# Patient Record
Sex: FEMALE | Race: WHITE | Employment: OTHER | URBAN - METROPOLITAN AREA
[De-identification: names, ages, dates, MRNs, and addresses within clinical notes are randomized per-mention and may not be internally consistent; named-entity substitution may affect disease eponyms.]

---

## 2020-03-10 ENCOUNTER — HOSPITAL ENCOUNTER (OUTPATIENT)
Dept: PHYSICAL THERAPY | Age: 70
Discharge: HOME | End: 2020-03-10
Payer: MEDICARE

## 2020-03-10 PROCEDURE — 97161 PT EVAL LOW COMPLEX 20 MIN: CPT

## 2020-03-10 PROCEDURE — 97110 THERAPEUTIC EXERCISES: CPT

## 2020-03-10 NOTE — PROGRESS NOTES
PT DAILY TREATMENT NOTE/LUMBAR EVAL 10-18    Patient Name: Zain Laguna  Date:3/10/2020  : 1950  [x]  Patient  Verified  Payor: [place] MEDICARE / Plan: [place] MEDICARE PART A & B / Product Type: Medicare /    In time:1100  Out time:1200  Total Treatment Time (min): 60  Visit #: 1 of 24    Medicare/BCBS Only   Total Timed Codes (min):  25 1:1 Treatment Time:  60     Treatment Area: Left hip pain [M25.552]  SUBJECTIVE  Pain Level (0-10 scale): 0  []constant [x]intermittent []improving []worsening []no change since onset    Any medication changes, allergies to medications, adverse drug reactions, diagnosis change, or new procedure performed?: [x] No    [] Yes (see summary sheet for update)  Subjective functional status/changes:       Patient presents with c/o left hip and groin pain since 2019 and has become progressively worse in the last two months. Patient describes pain as dull, grabbing. Denies numbness/tingling. Denies popping/clicking. Aggravating factors:bending for extended periods and reaching overhead. Alleviating factors: stretching and sometimes walking. Denies red flags: SOB, chest pain, dizziness/lightheadedness, blurred/double vision, HA, chills/fevers, night sweats, change in bowel/bladder control, abdominal pain, difficulty swallowing, slurred speech, unexplained weight gain/loss, nausea, vomiting. PMHx: OA, Hx of PE. Surgical Hx: Left and right TKA, left foot surgery. Social Hx: Lives with friend in a single story home, work status: retired teacher. PLOF: Volunteers at [employer] and hospital. CLOF: Reduced activity tolerance.   Diagnostic Imaging: Xray: Mild OA of the left hip    OBJECTIVE/EXAMINATION    35 min [x]Eval                  []Re-Eval       25 min Therapeutic Exercise:  [x] See flow sheet :   Rationale: increase ROM, increase strength and decrease pain to improve the patients ability to complete ADLs          With   [x] TE   [] TA   [] neuro   [] other: Patient Education: [x] Review HEP    [] Progressed/Changed HEP based on:   [] positioning   [] body mechanics   [] transfers   [] heat/ice application    [] other:      Physical Therapy Evaluation - Lumbar Spine    OBJECTIVE  Posture: Forward head and rounded shoulders    Gait:  [] Normal     [x] Abnormal: Patient ambulated with an antalgic gait pattern placing more weight on the right while attempting to unload left.      Active Movements: [] N/A   [] Too acute   [] Other:  ROM % AROM Comments:pain, area   Forward flexion 40-60 75    Extension 20-30 25    SB right 20-30 75    SB left 20-30 100    Rotation right 5-10 100    Rotation left 5-10 100      Repeated Movements   Effects on present pain: produces (MO), abolishes (A), increases (incr), decreases (decr), centralizes (C), peripheral (PH), no effect (NE)   Pre-Test Sx Flexion Repeated Flexion Extension Repeated Extension Repeated SBL Repeated SBR   Sitting  NE        Standing NE NE NE NE Incr Incr NE   Lying      N/A N/A   Comments: Increase in left lateral shift  Side Glide:Right Lateral shift reduced symptoms     Neuro Screen [x] WNL    Dural Mobility:  SLR Supine: [x] R    [x] L    [] +    [x] -    Slump Test: [x] R    [x] L    [] +    [x] -    Prone Knee Bend: [] R    [] L    [] +    [] -     Palpation  [] Min  [] Mod  [] Severe    Location:  [] Min  [] Mod  [] Severe    Location:    Strength   L(0-5) R (0-5) N/T   Hip Flexion (L1,2) 4- 4 []   Knee Extension (L3,4) 4 4+ []   Ankle Dorsiflexion (L4) 4 4 []   Great Toe Extension (L5) 4 4 []   Ankle Plantarflexion (S1) 4- 4 []   Knee Flexion (S1,2) 4- 4 []   Abdominals 4- 4- []   Paraspinals 4- 4- []   Back Rotators 4 4 []   Gluteus Roberto 4- 4- []   Hip Abduction 4- 4 []         Special Tests    Sacroilliac:  Gaenslen's: [] R    [] L    [] +    [x] -     Compression: [] +    [x] -     Gapping:  [] +    [x] -     Thigh Thrust: [] R    [] L    [] +    [x] -            Hip: Maryjane:  [] R    [] L    [] +    [x] -     Yasmine:  [] R    [] L    [] +    [x] -     Piriformis: [] R    [] L    [] +    [x] -     Other tests/comments:  ROM/Strength        AROM                      Hip Left Right   Flexion 120 120   Extension 35 30   Abduction 40 40   ER 45 45   IR 40 25     30 second sit to stand: 13 repetitions    Pain Level (0-10 scale) post treatment: 0    ASSESSMENT/Changes in Function:     Patient presents with c/o left hip and groin pain since October 2019 with no mechanism of injury that has become progressively worse in the last two months. Patient demonstrated reduced lumbar ROM. She has decreased strength in bilateral lower extremities left more than right. She has reduced global hip strength on the left. Patient ambulated with an antalgic gait pattern placing more weight on the right while attempting to unload left. She had an increase in symptoms with lumbar extension and left side bending. She had a reduction in symptoms with lateral glide and forward flexion activities. Patient presents with signs/symptoms consistent with lumbar radiculopathy. Patient will benefit from skilled PT services to modify and progress therapeutic interventions, address functional mobility deficits, address ROM deficits, address strength deficits, analyze and address soft tissue restrictions, analyze and cue movement patterns, analyze and modify body mechanics/ergonomics and assess and modify postural abnormalities to attain her goals.      [x]  See Plan of Care  []  See progress note/recertification  []  See Discharge Summary         Progress towards goals / Updated goals:  See POC    PLAN  []  Upgrade activities as tolerated     [x]  Continue plan of care  []  Update interventions per flow sheet       []  Discharge due to:_  []  Other:_      Nile Forman PT, DPT 3/10/2020  10:52 AM

## 2020-03-10 NOTE — PROGRESS NOTES
[facility] at [address] [place] [address] Drive: [phone]   Fax: [phone]  PLAN OF CARE / [address] [place] [address] PHYSICAL THERAPY SERVICES  Patient Name: Magy Velazquez : 1950   Medical   Diagnosis: Left hip pain [M25.552]  Low back pain [M54.5] Treatment Diagnosis: Left Hip Pain  Low Back Pain   Onset Date: Chronic     Referral Source: FELIX Berman Start of Care [facility]): 3/10/2020   Prior Hospitalization: See medical history Provider #: [id number]   Prior Level of Function: Volunteer at [employer] and [facility]   Comorbidities: OA, Hx of PE   Medications: Verified on Patient Summary List   The Plan of Care and following information is based on the information from the initial evaluation.   ===========================================================================================  Assessment / key information:  Magy Velazquez is a 78 y.o.  female presents with  c/o left hip and groin pain since 2019 with no mechanism of injury that has become progressively worse in the last two months. Patient demonstrated reduced lumbar ROM. She has decreased strength in bilateral lower extremities left more than right. She has reduced global hip strength on the left. Patient ambulated with an antalgic gait pattern placing more weight on the right while attempting to unload left. She had an increase in symptoms with lumbar extension and left side bending. She had a reduction in symptoms with lateral glide and forward flexion activities. Patient presents with signs/symptoms consistent with lumbar radiculopathy.  Patient will benefit from skilled PT services to modify and progress therapeutic interventions, address functional mobility deficits, address ROM deficits, address strength deficits, analyze and address soft tissue restrictions, analyze and cue movement patterns, analyze and modify body mechanics/ergonomics and assess and modify postural abnormalities to attain her goals. .    ===========================================================================================  Eval Complexity: History MEDIUM  Complexity : 1-2 comorbidities / personal factors will impact the outcome/ POC ;  Examination  LOW Complexity : 1-2 Standardized tests and measures addressing body structure, function, activity limitation and / or participation in recreation ; Presentation LOW Complexity : Stable, uncomplicated ;  Decision Making MEDIUM Complexity : FOTO score of 26-74; Overall Complexity LOW   Problem List: pain affecting function, decrease ROM, decrease strength, impaired gait/ balance, decrease ADL/ functional abilitiies, decrease activity tolerance, decrease flexibility/ joint mobility and decrease transfer abilities   Treatment Plan may include any combination of the following: Therapeutic exercise, Therapeutic activities, Neuromuscular re-education, Physical agent/modality, Gait/balance training, Manual therapy, Patient education, Self Care training, Functional mobility training, Home safety training and Stair training  Patient / Family readiness to learn indicated by: asking questions, trying to perform skills and interest  Persons(s) to be included in education: patient (P)  Barriers to Learning/Limitations: no  Measures taken if barriers to learning:   Patient Goal (s): \"reduction of pain, pain free would be even better\"   Patient self reported health status: excellent  Rehabilitation Potential: good  Goals:  Short Term Goals: To be accomplished in 4 weeks:   Patient will report compliance with HEP 1x/day to aid in rehabilitation program.   Status at IE: Provided initial HEP   Current: Same as IE    Long Term Goals: To be accomplished in 8 weeks:   Patient will increase bilateral LE strength to 5/5 MMT throughout to aid in completion of ADLs.    Status at IE:4-/5   Current:Same as IE     Patient will report pain no greater than 1-2/10 throughout entire day to aid in completion of ADLs.   Status at IE:0-5/10   Current:Same as IE     Patent will be able to sit for 30 mins to be able to drive to appointments and complete ADLs. Status at IE:Limited sitting tolerance   Current: Same as IE     Patent will be able to perform 16 repetitions during 30 second sit to stand test to improve activity tolerance with recreational activities and ADLs. Status at IE:13   Current: Same as IE     Patient will improve FOTO score to 66 points to demonstrate improvement in functional status. Status at IE:FOTO score = 55 (an established functional score where 100 = no disability)   Current: Same as IE      Frequency / Duration:   Patient to be seen 2  times per week for 12  weeks:  Patient / Caregiver education and instruction: self care and exercises      Therapist Signature: Mar Reynoso PT, DPT Date: 7/24/8102   Certification Period: 3/10/2020 - 6/8/2020 Time: 12:18 PM   ===========================================================================================  I certify that the above Physical Therapy Services are being furnished while the patient is under my care. I agree with the treatment plan and certify that this therapy is necessary. Physician Signature:        Date:       Time:     Please sign and return to [facility] or you may fax the signed copy to [phone]. Thank you.

## 2020-03-11 ENCOUNTER — HOSPITAL ENCOUNTER (OUTPATIENT)
Dept: PHYSICAL THERAPY | Age: 70
Discharge: HOME | End: 2020-03-11
Payer: MEDICARE

## 2020-03-11 PROCEDURE — 97110 THERAPEUTIC EXERCISES: CPT | Performed by: PHYSICAL THERAPIST

## 2020-03-11 NOTE — PROGRESS NOTES
PT DAILY TREATMENT NOTE    Patient Name: Laila Hernadez  Date:3/11/2020  : 1950  [x]  Patient  Verified  Payor: Myranda Muro / Plan: VA MEDICARE PART A & B / Product Type: Medicare /    In time:9:25  Out time:10:00  Total Treatment Time (min): 35  Total Timed Codes (min): 35  1:1 Treatment Time ([address] only): 35   Visit #: 2 of 24    Treatment Area: Left hip pain [M25.552]  Low back pain [M54.5]    SUBJECTIVE  Pain Level (0-10 scale): 1  Any medication changes, allergies to medications, adverse drug reactions, diagnosis change, or new procedure performed?: [x] No    [] Yes (see summary sheet for update)  Subjective functional status/changes:   [] No changes reported  Feels much better. Slept better last night. OBJECTIVE    35 min Therapeutic Exercise:  [x] See flow sheet :   Rationale: increase ROM, increase strength and decrease pain to improve the patients ability to complete ADLs    With   [] TE   [] TA   [] neuro   [] other: Patient Education: [x] Review HEP    [] Progressed/Changed HEP based on:   [] positioning   [] body mechanics   [] transfers   [] heat/ice application    [] other:      Other Objective/Functional Measures: NA     Pain Level (0-10 scale) post treatment: 0    ASSESSMENT/Changes in Function: Patient responds well to treatment session. Patient has minimal difficulty with exercises as prescribed. Does have some mild twinge of pain with bridges, but reports that it is tolerable.  . No adverse effects were noted from today's treatment session    Patient will continue to benefit from skilled PT services to modify and progress therapeutic interventions, address functional mobility deficits, address ROM deficits, address strength deficits, analyze and address soft tissue restrictions, analyze and cue movement patterns, analyze and modify body mechanics/ergonomics, assess and modify postural abnormalities    []  See Plan of Care  []  See progress note/recertification  []  See Discharge Summary         Progress towards goals / Updated goals:  Short Term Goals: To be accomplished in 4 weeks:                 Patient will report compliance with HEP 1x/day to aid in rehabilitation program.                 Status at IE: Provided initial HEP                 Current: Same as IE     Long Term Goals: To be accomplished in 8 weeks:                 Patient will increase bilateral LE strength to 5/5 MMT throughout to aid in completion of ADLs. Status at IE:4-/5                 Current:Same as IE                    Patient will report pain no greater than 1-2/10 throughout entire day to aid in completion of ADLs. Status at IE:0-5/10                 Current:Same as IE                    Patent will be able to sit for 30 mins to be able to drive to appointments and complete ADLs. Status at IE:Limited sitting tolerance                 Current: Same as IE                    Patent will be able to perform 16 repetitions during 30 second sit to stand test to improve activity tolerance with recreational activities and ADLs. Status at IE:13                 Current: Same as IE                    Patient will improve FOTO score to 66 points to demonstrate improvement in functional status.                  Status at IE:FOTO score = 55 (an established functional score where 100 = no disability)                 Current: Same as IE    PLAN  []  Upgrade activities as tolerated     [x]  Continue plan of care  []  Update interventions per flow sheet       []  Discharge due to:_  []  Other:_      Evans Dailey PT, DPT 3/11/2020  9:22 AM    Future Appointments   Date Time Provider Cecilia Santiago   3/11/2020  9:30 AM Meek Maynard PT, DPT [facility]   3/16/2020  9:00 AM Cate Saldivar [facility]   3/17/2020  3:30 PM Kwadwo Arreola, PT [facility]   4/3/2020  9:30 AM Meek Maynard PT, DPT [facility]   4/7/2020  2:00 PM Kwadwo Arreola, PT [facility] 4/9/2020  3:30 PM Anabella Pisano, PT [facility]   4/14/2020 10:00 AM Anabella Pisano, PT [facility]   4/16/2020  4:30 PM Josefina Childress, PT [facility]   4/21/2020 10:00 AM Anabella Pisano, PT [facility]   4/23/2020  4:30 PM Anabella Pisano, PT [facility]   4/28/2020 10:00 AM Anabella Pisano, PT [facility]   4/30/2020  4:30 PM Anabella Pisano, PT [facility]   5/5/2020 10:00 AM Anabella Pisano, PT [facility]   5/7/2020 10:00 AM Anabella Pisano, PT [facility]

## 2020-03-16 ENCOUNTER — HOSPITAL ENCOUNTER (OUTPATIENT)
Dept: PHYSICAL THERAPY | Age: 70
Discharge: HOME | End: 2020-03-16
Payer: MEDICARE

## 2020-03-16 PROCEDURE — 97110 THERAPEUTIC EXERCISES: CPT

## 2020-03-16 NOTE — PROGRESS NOTES
PT DAILY TREATMENT NOTE - [facility]     Patient Name: Nelson Back  Date:3/16/2020  : 1950  [x]  Patient  Verified  Payor: VA MEDICARE / Plan: VA MEDICARE PART A & B / Product Type: Medicare /    In time:0900  Out time:938  Total Treatment Time (min): 38  Total Timed Codes (min): 38   1:1 Treatment Time ( [address] only): 38   Visit #: 3 of 24    Treatment Area: Left hip pain [M25.552]  Low back pain [M54.5]    SUBJECTIVE  Pain Level (0-10 scale): 0  Any medication changes, allergies to medications, adverse drug reactions, diagnosis change, or new procedure performed?: [x] No    [] Yes (see summary sheet for update)  Subjective functional status/changes:   [] No changes reported  Patient reports that she is doing well and has less pain. OBJECTIVE     38 min Therapeutic Exercise:  [x] See flow sheet :   Rationale: increase ROM, increase strength and decrease pain to improve the patients ability to complete ADLs          With   [x] TE   [] TA   [] neuro   [] other: Patient Education: [x] Review HEP    [] Progressed/Changed HEP based on:   [] positioning   [] body mechanics   [] transfers   [] heat/ice application    [] other:      Other Objective/Functional Measures: NA     Pain Level (0-10 scale) post treatment: 0    ASSESSMENT/Changes in Function: Patient responds well to treatment session. Patient required cues for activity pacing. Reviewed HEP and provided hando to promote good carryover at home. No adverse effects were noted from today's treatment session    Patient will continue to benefit from skilled PT services to modify and progress therapeutic interventions, address functional mobility deficits, address ROM deficits, address strength deficits, analyze and address soft tissue restrictions, analyze and cue movement patterns, analyze and modify body mechanics/ergonomics, assess and modify postural abnormalities, and instruct in home and community integration to attain remaining goals.       []  See Plan of Care  []  See progress note/recertification  []  See Discharge Summary         Progress towards goals / Updated goals:  Short Term Goals: To be accomplished in 4 weeks:                 NFRABWD will report compliance with HEP 1x/day to aid in rehabilitation program.                 Status at IE: Provided initial HEP                 [id number]: In-progress reviewed HEP and provided handout 3/16/2020     Long Term Goals: To be accomplished in 8 weeks:                 GXQNBAD will increase bilateral LE strength to 5/5 MMT throughout to aid in completion of ADLs.                Status at IE:4-/5                 Current:Same as IE                    Patient will report pain no greater than 1-2/10 throughout entire day to aid in completion of ADLs.                Status at IE:0-5/10                 Current:Same as IE                    Patent will be able to sit for 30 mins to be able to drive to appointments and complete ADLs.                Status at IE:Limited sitting tolerance                 Current: Same as IE                    Patent will be able to perform 16 repetitions during 30 second sit to stand test to improve activity tolerance with recreational activities and ADLs.                Status at IE:13                 GKYQRSJ: Same as IE                    DENOPLC will improve FOTO score to 66 points to demonstrate improvement in functional status.                  Status at IE:FOTO score = 55 (an established functional score where 100 = no disability)                 Current: Same as IE       PLAN  []  Upgrade activities as tolerated     []  Continue plan of care  []  Update interventions per flow sheet       []  Discharge due to:_  []  Other:_      German De La Rosa PT, DPT 3/16/2020  9:09 AM    Future Appointments   Date Time Provider Cecilia Santiago   3/17/2020  3:30 PM RAISA Harmon [facility]   3/31/2020  9:30 AM RAISA Harmon [facility]   4/2/2020  3:30 PM Bernarda Lee [facility]   4/7/2020  2:00 PM Romeo Palm, PT [facility]   4/9/2020  3:30 PM Romeo Palm, PT [facility]   4/14/2020 10:00 AM Romeo Palm, PT [facility]   4/16/2020  4:30 PM CoxRosorin Lew, PT [facility]   4/21/2020 10:00 AM Romeo Palm, PT [facility]   4/23/2020  4:30 PM Romeo Palm, PT [facility]   4/28/2020 10:00 AM Romeo Palm, PT [facility]   4/30/2020  4:30 PM Romeo Palm, PT [facility]   5/5/2020 10:00 AM Romeo Palm, PT [facility]   5/7/2020 10:00 AM Romeo Palm, PT [facility]

## 2020-03-17 ENCOUNTER — HOSPITAL ENCOUNTER (OUTPATIENT)
Dept: PHYSICAL THERAPY | Age: 70
Discharge: HOME | End: 2020-03-17
Payer: MEDICARE

## 2020-03-17 PROCEDURE — 97110 THERAPEUTIC EXERCISES: CPT

## 2020-03-17 NOTE — PROGRESS NOTES
PT DAILY TREATMENT NOTE - [facility]     Patient Name: Johnnie Mac  Date:3/17/2020  : 1950  [x]  Patient  Verified  Payor: Pallavi Fish / Plan: [place] MEDICARE PART A & B / Product Type: Medicare /      In time:330  Out time:405  Total Treatment Time (min): 35  Total Timed Codes (min): 35   1:1 Treatment Time ([facility] only): 30   Visit #: 4 of 24    Treatment Area: Left hip pain [M25.552]  Low back pain [M54.5]    SUBJECTIVE  Pain Level (0-10 scale): 0  Any medication changes, allergies to medications, adverse drug reactions, diagnosis change, or new procedure performed?: [x] No    [] Yes (see summary sheet for update)  Subjective functional status/changes:   [] No changes reported     Patient reports occasional soreness but no pain today. She reports compliance with HEP. OBJECTIVE    30 min Therapeutic Exercise:  [x] See flow sheet :   Rationale: increase ROM, increase strength and decrease pain to improve the patients ability to complete ADLs          With   [x] TE   [] TA   [] neuro   [] other: Patient Education: [x] Review HEP    [] Progressed/Changed HEP based on:   [] positioning   [] body mechanics   [] transfers   [] heat/ice application    [] other:      Other Objective/Functional Measures: NA     Pain Level (0-10 scale) post treatment: 0    ASSESSMENT/Changes in Function:     Patient responds well to treatment session as she had a reduction in symptoms. Provided cues for activity pacing. She also required cues to avoid trunk compensatory strategies with lateral walks. No adverse effects were noted from today's treatment session.     Patient will continue to benefit from skilled PT services to modify and progress therapeutic interventions, address functional mobility deficits, address ROM deficits, address strength deficits, analyze and address soft tissue restrictions, analyze and cue movement patterns, analyze and modify body mechanics/ergonomics, assess and modify postural abnormalities, and instruct in home and community integration to attain remaining goals. []  See Plan of Care  []  See progress note/recertification  []  See Discharge Summary         Progress towards goals / Updated goals:  Short Term Goals: To be accomplished in 4 weeks:                 BGYOWQD will report compliance with HEP 1x/day to aid in rehabilitation program.                 Status at IE: Provided initial HEP                 KQUGDGO: In-progress reviewed HEP and provided handout 3/16/2020     Long Term Goals: To be accomplished in 8 weeks:                 IHBQZUK will increase bilateral LE strength to 5/5 MMT throughout to aid in completion of ADLs.                Status at IE:4-/5                 Current:Same as IE                    Patient will report pain no greater than 1-2/10 throughout entire day to aid in completion of ADLs.                Status at IE:0-5/10                 Current:Same as IE                    Patent will be able to sit for 30 mins to be able to drive to appointments and complete ADLs.                Status at IE:Limited sitting tolerance                 Current: Same as IE                    Patent will be able to perform 16 repetitions during 30 second sit to stand test to improve activity tolerance with recreational activities and ADLs.                Status at IE:13                 STSDUJD: Same as IE                    HFEVIVB will improve FOTO score to 66 points to demonstrate improvement in functional status.                  Status at IE:FOTO score = 55 (an established functional score where 100 = no disability)                 Current: Same as IE  PLAN  []  Upgrade activities as tolerated     [x]  Continue plan of care  []  Update interventions per flow sheet       []  Discharge due to:_  []  Other:_      Kika Pascual PT, DPT 3/17/2020  3:17 PM    Future Appointments   Date Time Provider Cecilia Santiago   3/17/2020  3:30 PM RAISA Sim [facility]   3/31/2020  9:30 AM Larissa Gordon Manjit Vallejo, PT [facility]   4/2/2020  3:30 PM Bree Luz Maria, PT [facility]   4/7/2020  2:00 PM Bree Luz Maria, PT [facility]   4/9/2020  3:30 PM Bree Luz Maria, PT [facility]   4/14/2020 10:00 AM Bree Luz Maria, PT [facility]   4/16/2020  4:30 PM CoxCorbin Adams, PT [facility]   4/21/2020 10:00 AM Bree Luz Maria, PT [facility]   4/23/2020  4:30 PM Bree Luz Maria, PT [facility]   4/28/2020 10:00 AM Bree Luz Maria, PT [facility]   4/30/2020  4:30 PM Bree Luz Maria, PT [facility]   5/5/2020 10:00 AM Bree Luz Maria, PT [facility]   5/7/2020 10:00 AM Bree Luz Maria, PT [facility]

## 2020-03-24 ENCOUNTER — APPOINTMENT (OUTPATIENT)
Dept: PHYSICAL THERAPY | Age: 70
End: 2020-03-24
Payer: MEDICARE

## 2020-03-25 NOTE — PROGRESS NOTES
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 female with  c/o left hip and groin pain since 2019 with no mechanism of injury that has become progressively worse in the last two months. Pt has been making good progress towards goals however is to be placed on hold at this time due public health concerns for [address] [place] [address]. Pt has been given an updated HEP with option of PT sending out an updated HEP via e-mail if needed to stay in communication with therapist. If pt's symptoms return and are unable to be managed with exercises at home pt educated to follow-up with therapist to be taken off hold as appropriate. Patient care will resume to previous established frequency when public health crisis has subsided.     ===========================================================================================    Plan: Patient care will resume to previous established frequency when public health crisis has subsided. Continue therapy per initial plan/protocol at a frequency of  2 x per week for 6 weeks upon return to therapy. Goals:   Short Term Goals:  To be accomplished in 4 weeks:                 Patient will report compliance with HEP 1x/day to aid in rehabilitation program.                 Status at IE: Provided initial HEP Current: Same as IE     Long Term Goals: To be accomplished in 8 weeks:                 Patient will increase bilateral LE strength to 5/5 MMT throughout to aid in completion of ADLs. Status at IE:4-/5                 Current:Same as IE                    Patient will report pain no greater than 1-2/10 throughout entire day to aid in completion of ADLs. Status at IE:0-5/10                 Current:Same as IE                    Patent will be able to sit for 30 mins to be able to drive to appointments and complete ADLs. Status at IE:Limited sitting tolerance                 Current: Same as IE                    Patent will be able to perform 16 repetitions during 30 second sit to stand test to improve activity tolerance with recreational activities and ADLs. Status at IE:13                 Current: Same as IE                    Patient will improve FOTO score to 66 points to demonstrate improvement in functional status. Status at IE:FOTO score = 55 (an established functional score where 100 = no disability)                 Current: Same as IE    ===========================================================================================  Subjective: NA      Objective: NA    Therapist Signature: Brandon Nguyễn PT, DPT Date: 7/34/7846   Re-Certification:  Time: 8:43 AM     I certify that the above Therapy Services are being furnished while the patient is under my care. I agree with the treatment plan and certify that this therapy is necessary. [facility] at [address]         Phone: [phone]   Fax: [phone]  .

## 2020-03-26 ENCOUNTER — APPOINTMENT (OUTPATIENT)
Dept: PHYSICAL THERAPY | Age: 70
End: 2020-03-26
Payer: MEDICARE

## 2020-03-31 ENCOUNTER — APPOINTMENT (OUTPATIENT)
Dept: PHYSICAL THERAPY | Age: 70
End: 2020-03-31
Payer: MEDICARE

## 2020-04-02 ENCOUNTER — APPOINTMENT (OUTPATIENT)
Dept: PHYSICAL THERAPY | Age: 70
End: 2020-04-02

## 2020-04-03 ENCOUNTER — APPOINTMENT (OUTPATIENT)
Dept: PHYSICAL THERAPY | Age: 70
End: 2020-04-03

## 2020-04-07 ENCOUNTER — APPOINTMENT (OUTPATIENT)
Dept: PHYSICAL THERAPY | Age: 70
End: 2020-04-07

## 2020-04-09 ENCOUNTER — APPOINTMENT (OUTPATIENT)
Dept: PHYSICAL THERAPY | Age: 70
End: 2020-04-09

## 2020-04-14 ENCOUNTER — APPOINTMENT (OUTPATIENT)
Dept: PHYSICAL THERAPY | Age: 70
End: 2020-04-14

## 2020-04-16 ENCOUNTER — APPOINTMENT (OUTPATIENT)
Dept: PHYSICAL THERAPY | Age: 70
End: 2020-04-16

## 2020-04-21 ENCOUNTER — APPOINTMENT (OUTPATIENT)
Dept: PHYSICAL THERAPY | Age: 70
End: 2020-04-21

## 2020-04-23 ENCOUNTER — APPOINTMENT (OUTPATIENT)
Dept: PHYSICAL THERAPY | Age: 70
End: 2020-04-23

## 2020-04-28 ENCOUNTER — APPOINTMENT (OUTPATIENT)
Dept: PHYSICAL THERAPY | Age: 70
End: 2020-04-28

## 2020-04-30 ENCOUNTER — APPOINTMENT (OUTPATIENT)
Dept: PHYSICAL THERAPY | Age: 70
End: 2020-04-30

## 2020-05-05 ENCOUNTER — APPOINTMENT (OUTPATIENT)
Dept: PHYSICAL THERAPY | Age: 70
End: 2020-05-05

## 2020-05-07 ENCOUNTER — HOSPITAL ENCOUNTER (OUTPATIENT)
Dept: PHYSICAL THERAPY | Age: 70
End: 2020-05-07

## 2020-09-28 ENCOUNTER — HOSPITAL ENCOUNTER (OUTPATIENT)
Dept: PHYSICAL THERAPY | Age: 70
Discharge: HOME | End: 2020-09-28
Payer: MEDICARE

## 2020-09-28 PROCEDURE — 97161 PT EVAL LOW COMPLEX 20 MIN: CPT | Performed by: PHYSICAL THERAPIST

## 2020-09-28 PROCEDURE — 97110 THERAPEUTIC EXERCISES: CPT | Performed by: PHYSICAL THERAPIST

## 2020-09-28 NOTE — PROGRESS NOTES
[facility] at [address] [place] [address] Drive: [phone]   Fax: [phone]  PLAN OF CARE / [address] [place] [address] PHYSICAL THERAPY SERVICES  Patient Name: Marissa Jimenez : 1950   Medical   Diagnosis: Left hip pain [M25.552] Treatment Diagnosis: Left hip pain   Onset Date: 1 year     Referral Source: Raul Landeros MD Start of Care [facility]): 2020   Prior Hospitalization: See medical history Provider #: [id number]   Prior Level of Function: Active, independent w/ ADLs   Comorbidities: OA, Hx of PE   Medications: Verified on Patient Summary List   The Plan of Care and following information is based on the information from the initial evaluation.   ===========================================================================================  Assessment / kim information:  Marissa Jimenez is a 78 y.o.  yo female presents with signs/symptoms consistent with left lumbar radicular pain. Positive neural tension and directional preference. Symptoms reduced with right lateral shift correction.      Patient will continue to benefit from skilled PT services to modify and progress therapeutic interventions, address functional mobility deficits, address ROM deficits, address strength deficits, analyze and address soft tissue restrictions, analyze and cue movement patterns, analyze and modify body mechanics/ergonomics and assess and modify postural abnormalities to attain remaining goals. .    Pt instructed in HEP and will f/u in clinic for PT.  ===========================================================================================  Eval Complexity: History MEDIUM  Complexity : 1-2 comorbidities / personal factors will impact the outcome/ POC ;  Examination  LOW Complexity : 1-2 Standardized tests and measures addressing body structure, function, activity limitation and / or participation in recreation ; Presentation LOW Complexity : Stable, uncomplicated ;  Decision Making MEDIUM Complexity : FOTO score of 26-74; Overall Complexity LOW   Problem List: pain affecting function, decrease ROM, decrease strength, impaired gait/ balance, decrease ADL/ functional abilitiies, decrease activity tolerance, decrease flexibility/ joint mobility and decrease transfer abilities   Treatment Plan may include any combination of the following: Therapeutic exercise, Therapeutic activities, Neuromuscular re-education, Physical agent/modality, Gait/balance training, Manual therapy, Patient education, Self Care training and Functional mobility training  Patient / Family readiness to learn indicated by: asking questions, trying to perform skills and interest  Persons(s) to be included in education: patient (P)  Barriers to Learning/Limitations: no  Measures Taken: NA  Patient Goal (s): \"better mobility, less pain\"   Patient self reported health status: good  Rehabilitation Potential: good  Goals:  Short Term Goals: To be accomplished in 2 weeks:   Patient will report compliance with HEP 1x/day to aid in rehabilitation program.   Status at IE: NA   Current: Same as IE      Patient will increase lumbar ROM to 100% in all planes to aid in completion of ADLs. Status at [address] [place] [address] with lateral shift   Current: Same as IE    Long Term Goals: To be accomplished in 6 weeks:   Patient will increase B LE strength to 5/5 MMT throughout to aid in completion of ADLs. Status at IE:4/5   Current: Same as IE     Patient will report pain no greater than 1-2/10 throughout entire day to aid in completion of ADLs. Status at IE: 1-6   Current: Same as IE     Patent will be able to reach overhead 10x without pain to be able to complete ADLs. Status at [address] [place] [address] reaching and lifting overhead   Current: Same as IE     Patient will improve FOTO score to 73 points to demonstrate improvement in functional status.    Status at IE:72   Current: Same as IE   *FOTO score is an established functional score where 100 = no disability*      Frequency / Duration:   Patient to be seen 2  times per week for 6  weeks:  Patient / Caregiver education and instruction: self care and exercises      . Therapist Signature: Charissa Moore DPT, OCS Date: 5/74/4917   Certification Period: 9/28/2020 - 12/21/2020 Time: 3:22 PM   ===========================================================================================  I certify that the above Physical Therapy Services are being furnished while the patient is under my care. I agree with the treatment plan and certify that this therapy is necessary. Physician Signature:        Date:       Time:     Please sign and return to [facility] or you may fax the signed copy to [phone]. Thank you.

## 2020-09-28 NOTE — PROGRESS NOTES
PT DAILY TREATMENT NOTE/LUMBAR EVAL 10-18    Patient Name: Noemy Garcia  Date:2020  : 1950  [x]  Patient  Verified  Payor: VA MEDICARE / Plan: VA MEDICARE PART A & B / Product Type: Medicare /    In time:1:45  Out time:2:30  Total Treatment Time (min): 45  Visit #: 1 of 12    Medicare/BCBS Only   Total Timed Codes (min):  25 1:1 Treatment Time:  45     Treatment Area: Left hip pain [M25.552]  SUBJECTIVE  Pain Level (0-10 scale): 6  []constant []intermittent []improving []worsening []no change since onset    Any medication changes, allergies to medications, adverse drug reactions, diagnosis change, or new procedure performed?: [x] No    [] Yes (see summary sheet for update)  Subjective functional status/changes:     Patient has c/c of left hip/groin pain since 1 year. . Pain is located left anterior groin, anterior thigh, occasional in the low back . BARBARA: uncertain, denies BARBARA. Patient describes pain as sharp, stabbing, ache in the back, intermittent (sometimes all day without pain. No diurnal features. Denies numbness/tingling. Denies popping/clicking. Aggravating factors: reaching overhead, turning over at night, occasionally stair, prolonged walking. Alleviating factors: going for a walk, sometimes icing, heat on lumbar. Denies red flags: SOB, chest pain, dizziness/lightheadedness, blurred/double vision, HA, chills/fevers, night sweats, change in bowel/bladder control, abdominal pain, difficulty swallowing, slurred speech, unexplained weight gain/loss, nausea, vomiting. PMHx: OA, Hx of PE. Surgical Hx: right and left TKA on 2017 & 2018. Social Hx: 2 level home denies: alcohol, tobacco denies, work status. PLOF: active, walking, gym. CLOF: unable . Diagnostic Imaging: x-ray, unremarkable. Recent falls Hx: denies.       OBJECTIVE/EXAMINATION  20 min []Eval                  []Re-Eval       25 min Therapeutic Exercise:  [] See flow sheet :   Rationale: increase ROM, increase strength and decrease pain to improve the patients ability to complete ADLs        With   [] TE   [] TA   [] neuro   [] other: Patient Education: [x] Review HEP    [] Progressed/Changed HEP based on:   [] positioning   [] body mechanics   [] transfers   [] heat/ice application    [] other:      Physical Therapy Evaluation - Lumbar Spine  :    OBJECTIVE  Posture:  Lateral Shift: [] R    [] L     [] +  [x] -  Kyphosis: [] Increased [] Decreased   []  WNL  Lordosis:  [] Increased [] Decreased   [] WNL  Pelvic symmetry: [] WNL    [] Other:    Gait:  [] Normal     [] Abnormal:    Active Movements: [] N/A   [] Too acute   [] Other:  ROM % AROM Comments:pain, area   Forward flexion 40-60 100    Extension 20-30 50    SB right 20-30 50    SB left 20-30 50    Rotation right 5-10 75    Rotation left 5-10 75      Repeated Movements   Effects on present pain: produces (PA), abolishes (A), increases (incr), decreases (decr), centralizes (C), peripheral (PH), no effect (NE)   Pre-Test Sx Flexion Repeated Flexion Extension Repeated Extension Repeated SBL Repeated SBR   Sitting    incr incr     Standing          Lying      N/A N/A   Comments:  Side Glide: right lateral shift correction, decreases pain  Sustained passive positioning test:    Neuro Screen [] WNL  Myotome/Dermatome/Reflexes:  Comments:    Dural Mobility:  SLR Supine: [] R    [x] L    [x] +    [] -  @ (degrees):   Slump Test: [] R    [x] L    [x] +    [] -  @ (degrees):   Prone Knee Bend: [] R    [] L    [] +    [] -     Palpation  [] Min  [] Mod  [] Severe    Location:  [] Min  [] Mod  [] Severe    Location:    Strength   L(0-5) R (0-5) N/T   Hip Flexion (L1,2) 4 4 []   Knee Extension (L3,4) 5 5 []   Ankle Dorsiflexion (L4) 4- 4- []   Great Toe Extension (L5) 4+ 4+ []   Ankle Plantarflexion (S1) 5 5 []   Knee Flexion (S1,2) 5 5 []   Abdominals 4 4 []   Paraspinals 4 4 []   Back Rotators 4 4 []   Gluteus Roberto 4 4 []   Hip Abduction 4 4 []         Special Tests  Lumbar:  Lumb.  Compression: [] Pos  [] Neg               Lumbar Distraction:   [] Pos  [] Neg    Quadrant:  [] Pos  [] Neg   [] Flex  [] Ext    Sacroilliac:  Gaenslen's: [] R    [] L    [] +    [x] -     Compression: [] +    [] -     Gapping:  [] +    [] -     Thigh Thrust: [] R    [] L    [] +    [] -            Hip: Abdulaziz Breaker:  [] R    [] L    [] +    [x] -     Scour:  [] R    [] L    [] +    [] -     Piriformis: [] R    [] L    [] +    [] -     Other tests/comments:       Pain Level (0-10 scale) post treatment: 0    ASSESSMENT/Changes in Function: Patient presents with signs/symptoms consistent with left lumbar radicular pain. Positive neural tension and directional preference. Symptoms reduced with right lateral shift correction. Patient will continue to benefit from skilled PT services to modify and progress therapeutic interventions, address functional mobility deficits, address ROM deficits, address strength deficits, analyze and address soft tissue restrictions, analyze and cue movement patterns, analyze and modify body mechanics/ergonomics and assess and modify postural abnormalities to attain remaining goals.      [x]  See Plan of Care  []  See progress note/recertification  []  See Discharge Summary         Progress towards goals / Updated goals:  See POC    PLAN  []  Upgrade activities as tolerated     [x]  Continue plan of care  []  Update interventions per flow sheet       []  Discharge due to:_  []  Other:_      Elisa Enriquez, PT, DPT 9/28/2020  1:57 PM

## 2020-09-30 ENCOUNTER — HOSPITAL ENCOUNTER (OUTPATIENT)
Dept: PHYSICAL THERAPY | Age: 70
Discharge: HOME | End: 2020-09-30
Payer: MEDICARE

## 2020-09-30 PROCEDURE — 97110 THERAPEUTIC EXERCISES: CPT

## 2020-09-30 NOTE — PROGRESS NOTES
PT DAILY TREATMENT NOTE - [facility]     Patient Name: Ines Dai  Date:2020  : 1950  [x]  Patient  Verified  Payor: VA MEDICARE / Plan: VA MEDICARE PART A & B / Product Type: Medicare /    In IHKP:[id number]  Out time:1003  Total Treatment Time (min): 38  Total Timed Codes (min): 38   1:1 Treatment Time ([facility] only): 38   Visit #: 2 of 12    Treatment Area: Left hip pain [M25.552]    SUBJECTIVE  Pain Level (0-10 scale): 2  Any medication changes, allergies to medications, adverse drug reactions, diagnosis change, or new procedure performed?: [x] No    [] Yes (see summary sheet for update)  Subjective functional status/changes:   [] No changes reported    Patient reports that lateral shifts reduces left groin pain but pain is still present in low back/posterior hip. OBJECTIVE    38 min Therapeutic Exercise:  [x] See flow sheet :   Rationale: increase ROM, increase strength and decrease pain to improve the patients ability to complete ADLs         With   [x] TE   [] TA   [] neuro   [] other: Patient Education: [x] Review HEP    [] Progressed/Changed HEP based on:   [] positioning   [] body mechanics   [] transfers   [] heat/ice application    [] other:      Other Objective/Functional Measures: 0     Pain Level (0-10 scale) post treatment: 0.5    ASSESSMENT/Changes in Function: Patient responds well to treatment session. Patient had pain in posterior hip with hip 3 way exercise, added resistance to the activity and her symptoms were reduced. Patient was challenged with exercises prescribed. Reviewed lateral shift and encouraged patient to utilize the exercise as required to manage her symptoms.  No adverse effects were noted from today's treatment session      Patient will continue to benefit from skilled PT services to modify and progress therapeutic interventions, address functional mobility deficits, address ROM deficits, address strength deficits, analyze and address soft tissue restrictions, analyze and cue movement patterns, analyze and modify body mechanics/ergonomics, assess and modify postural abnormalities, and instruct in home and community integration to attain remaining goals. []  See Plan of Care  []  See progress note/recertification  []  See Discharge Summary         Progress towards goals / Updated goals:  Short Term Goals: To be accomplished in 2 weeks:                   Patient will report compliance with HEP 1x/day to aid in rehabilitation program.                   Status at IE: NA                   Current: In-progress, reviewed lateral shift. 09/30/3030                      Patient will increase lumbar ROM to 100% in all planes to aid in completion of ADLs. Status at [address] [place] [address] with lateral shift                   Current: Same as IE     Long Term Goals: To be accomplished in 6 weeks:                   Patient will increase B LE strength to 5/5 MMT throughout to aid in completion of ADLs. Status at IE:4/5                   Current: Same as IE                      Patient will report pain no greater than 1-2/10 throughout entire day to aid in completion of ADLs. Status at IE: 1-6                   Current: Same as IE                      Patent will be able to reach overhead 10x without pain to be able to complete ADLs. Status at [address] [place] [address] reaching and lifting overhead                   Current: Same as IE                      Patient will improve FOTO score to 73 points to demonstrate improvement in functional status.                    Status at IE:72                   Current: Same as IE              *FOTO score is an established functional score where 100 = no disability*    PLAN  []  Upgrade activities as tolerated     [x]  Continue plan of care  []  Update interventions per flow sheet       []  Discharge due to:_  []  Other:_      Ashlee Rodriguez PT, DPT 9/30/2020  9:31 AM    Future Appointments   Date Time Provider Cecilia Pamela   10/6/2020  9:30 AM Milo Heads, PT, DPT [facility]   10/9/2020  1:45 PM Celestia Liam, PT [facility]   10/13/2020  9:30 AM Milo Heads, PT, DPT [facility]   10/20/2020  9:30 AM Milo Heads, PT, DPT [facility]   10/23/2020  9:30 AM Milo Heads, PT, DPT [facility]   10/27/2020 11:45 AM Milo Heads, PT, DPT [facility]   10/30/2020 10:15 AM Milo Heads, PT, DPT [facility]

## 2020-10-02 NOTE — PROGRESS NOTES
[facility] at [address]: [phone]   Fax: [phone]  Discharge Summary  Patient Name: Magy Velazquez : 1950   Medical   Diagnosis: Left hip pain [M25.552]  Low back pain [M54.5] Treatment Diagnosis: Left  Hip pain  Low back pain   Onset Date: Chronic      Referral Source: FELIX Berman Start of Care [facility]): 3/10/2020   Prior Hospitalization: See medical history Provider #: [id number]   Prior Level of Function: Volunteer at [employer]   Comorbidities: OA, Hx of PE   Medications: Verified on Patient Summary List      ===========================================================================================  Assessment / Summary of Care:  Magy Velazquez is a 78 y.o.  yo female who was referred for Physical Therapy services at our location. Pt attended four Physical Therapy sessions through mid-2020 and was placed on hold due public health concerns for  [facility]. Plan was for patient to contact clinic to re-start PT once coronavirus lockdown was lifted. Patient has not contacted clinic with request to continue PT, so is now discharged from PT services. Pt was provided an updated HEP prior to being put on hold due to coronavirus. Below is patient progress towards Physical Therapy goals at last attended visit.    ===========================================================================================    Plan: Discharge to independent HEP. Short Term Goals: To be accomplished in 4 weeks:                 ANNA will report compliance with HEP 1x/day to aid in rehabilitation program.                 Status at : Provided initial HEP                 Current: Same as [address] be accomplished in 8 weeks:                 IHEVPIF will increase bilateral LE strength to 5/5 MMT throughout to aid in completion of ADLs.                  Status at IE:4-/5                 Current:Same as IE                    Patient will report pain no greater than 1-2/10 throughout entire day to aid in completion of ADLs.                Status at IE:0-5/10                 Current:Same as IE                    Patent will be able to sit for 30 mins to be able to drive to appointments and complete ADLs.                Status at IE:Limited sitting tolerance                 Current: Same as IE                    Patent will be able to perform 16 repetitions during 30 second sit to stand test to improve activity tolerance with recreational activities and ADLs.                Status at IE:13                 PZZUCTX: Same as IE                    JMTHYDT will improve FOTO score to 66 points to demonstrate improvement in functional status.                  Status at IE:FOTO score = 55 (an established functional score where 100 = no disability)                 Current: Same as IE    ===========================================================================================    Therapist Signature: Jayde Basurto PT, DPT Date: 10/2/2020     Time: 3:06 PM

## 2020-10-06 ENCOUNTER — HOSPITAL ENCOUNTER (OUTPATIENT)
Dept: PHYSICAL THERAPY | Age: 70
Discharge: HOME | End: 2020-10-06
Payer: MEDICARE

## 2020-10-06 PROCEDURE — 97110 THERAPEUTIC EXERCISES: CPT | Performed by: PHYSICAL THERAPIST

## 2020-10-06 NOTE — PROGRESS NOTES
PT DAILY TREATMENT NOTE    Patient Name: Dahiana Spence  Date:10/6/2020  : 1950  [x]  Patient  Verified  Payor: Simi Estrada / Plan: [place] MEDICARE PART A & B / Product Type: Medicare /    In time:9:26  Out time:9:56  Total Treatment Time (min): 30  Total Timed Codes (min): 30  1:1 Treatment Time ( [address] only): 30   Visit #: 3 of 12    Treatment Area: Left hip pain [M25.552]    SUBJECTIVE  Pain Level (0-10 scale): 2  Any medication changes, allergies to medications, adverse drug reactions, diagnosis change, or new procedure performed?: [x] No    [] Yes (see summary sheet for update)  Subjective functional status/changes:   [] No changes reported  Feels good. The lateral shift correction still is helpful. OBJECTIVE    30 min Therapeutic Exercise:  [x] See flow sheet :   Rationale: increase ROM, increase strength and decrease pain to improve the patients ability to complete ADLs    With   [] TE   [] TA   [] neuro   [] other: Patient Education: [x] Review HEP    [] Progressed/Changed HEP based on:   [] positioning   [] body mechanics   [] transfers   [] heat/ice application    [] other:      Other Objective/Functional Measures: NA     Pain Level (0-10 scale) post treatment: 1    ASSESSMENT/Changes in Function: Patient responds well to treatment session. Patient is progressing well. Minimal difficulty with exercises as prescribed. Educated on appropriate rest times. Will increase number of sets next visit.  No adverse effects were noted from today's treatment session       Patient will continue to benefit from skilled PT services to modify and progress therapeutic interventions, address functional mobility deficits, address ROM deficits, address strength deficits, analyze and address soft tissue restrictions, analyze and cue movement patterns, analyze and modify body mechanics/ergonomics, assess and modify postural abnormalities    []  See Plan of Care  []  See progress note/recertification  []  See Discharge Summary         Progress towards goals / Updated goals:  Short Term Goals: To be accomplished in 2 weeks:                   WZQQAQZ will report compliance with HEP 1x/day to aid in rehabilitation program.                   Status at IE: NA                   Current: In-progress, reviewed lateral shift. 09/30/3030                      Patient will increase lumbar ROM to 100% in all planes to aid in completion of ADLs.                  Status at [address] [place] [address] with lateral SB                   Current: 100%, met 10/6/2020     Long Term Goals: To be accomplished in 6 weeks:                   Patient will increase B LE strength to 5/5 MMT throughout to aid in completion of ADLs.                  Status at IE:4/5                   Current: Same as IE                      Patient will report pain no greater than 1-2/10 throughout entire day to aid in completion of ADLs.                  Status at IE: 1-6                   Current: Same as IE                      Patent will be able to reach overhead 10x without pain to be able to complete ADLs.                  Status at [facility] reaching and lifting overhead                   Current: Same as IE                      Patient will improve FOTO score to 73 points to demonstrate improvement in functional status.                    Status at IE:72                   Current: Same as IE                   *FOTO score is an established functional score where 100 = no disability*    PLAN  []  Upgrade activities as tolerated     [x]  Continue plan of care  []  Update interventions per flow sheet       []  Discharge due to:_  []  Other:_      Todd Sigala PT, DPT 10/6/2020  9:32 AM    Future Appointments   Date Time Provider Cecilia Santiago   10/9/2020  1:45 PM RAISA GottliebTFRED THE [facility]   10/13/2020  9:30 AM Casey Sierra PT, DPT [facility]   10/20/2020  9:30 AM Casey Sierra PT, DPT [facility]   10/23/2020  9:30 AM Casey Sierra PT, DPT [facility]   10/27/2020 11:45 AM Toan Saxena PT, CYNTHIA [facility]   10/30/2020 10:15 AM Toan Saxena PT, CYNTHIA MOYER THE [facility]

## 2020-10-09 ENCOUNTER — HOSPITAL ENCOUNTER (OUTPATIENT)
Dept: PHYSICAL THERAPY | Age: 70
Discharge: HOME | End: 2020-10-09
Payer: MEDICARE

## 2020-10-09 PROCEDURE — 97110 THERAPEUTIC EXERCISES: CPT

## 2020-10-09 NOTE — PROGRESS NOTES
PT DAILY TREATMENT NOTE - [facility]     Patient Name: Noemy Garcia  Date:10/9/2020  : 1950  [x]  Patient  Verified  Payor: [place] MEDICARE / Plan: [place] MEDICARE PART A & B / Product Type: Medicare /    In time:1345  Out time:1415  Total Treatment Time (min): 30  Total Timed Codes (min): 30   1:1 Treatment Time ( only): 30   Visit #: 4 of 12    Treatment Area: Left hip pain [M25.552]    SUBJECTIVE  Pain Level (0-10 scale): 0  Any medication changes, allergies to medications, adverse drug reactions, diagnosis change, or new procedure performed?: [x] No    [] Yes (see summary sheet for update)  Subjective functional status/changes:   [] No changes reported    Patient reports that she was sore after last visit but has no pain today     OBJECTIVE    30 min Therapeutic Exercise:  [x] See flow sheet :   Rationale: increase ROM, increase strength and decrease pain to improve the patients ability to complete ADLs          With   [x] TE   [] TA   [] neuro   [] other: Patient Education: [x] Review HEP    [] Progressed/Changed HEP based on:   [] positioning   [] body mechanics   [] transfers   [] heat/ice application    [] other:      Other Objective/Functional Measures: NA     Pain Level (0-10 scale) post treatment: 0    ASSESSMENT/Changes in Function: Patient responds well to treatment session. Patient was challenged with exercises prescribed. Reviewed HEP and provided handout and theraband. Will introduce dead lift next visit as patient reports challenges when picking items up off of the floor.  No adverse effects were noted from today's treatment session    Patient will continue to benefit from skilled PT services to modify and progress therapeutic interventions, address functional mobility deficits, address ROM deficits, address strength deficits, analyze and address soft tissue restrictions, analyze and cue movement patterns, analyze and modify body mechanics/ergonomics, assess and modify postural abnormalities, instruct in home and community integration to attain remaining goals. []  See Plan of Care  []  See progress note/recertification  []  See Discharge Summary         Progress towards goals / Updated goals:  Short Term Goals: To be accomplished in 2 weeks:                   ELI will report compliance with HEP 1x/day to aid in rehabilitation program.                   Status at IE: NA                   Current:  In-progress, reviewed HEP and provided handout 10/09/2020                      Patient will increase lumbar ROM to 100% in all planes to aid in completion of ADLs.                  Status at [address] [place] [address] with lateral SB                   Current: 100%, met 10/6/2020     Long Term Goals: To be accomplished in 6 weeks:                   Patient will increase B LE strength to 5/5 MMT throughout to aid in completion of ADLs.                  Status at IE:4/5                   Current: Same as IE                      Patient will report pain no greater than 1-2/10 throughout entire day to aid in completion of ADLs.                  Status at IE: 1-6                   Current: Same as IE                      Patent will be able to reach overhead 10x without pain to be able to complete ADLs.                  Status at [address] [place] [address] reaching and lifting overhead                   Current: Same as IE                      Patient will improve FOTO score to 73 points to demonstrate improvement in functional status.                    Status at IE:72                   Current: Same as IE                   *FOTO score is an established functional score where 100 = no disability*    PLAN  []  Upgrade activities as tolerated     [x]  Continue plan of care  []  Update interventions per flow sheet       []  Discharge due to:_  []  Other:_      Melvina Teran PT, DPT 10/9/2020  1:46 PM    Future Appointments   Date Time Provider Cecilia Santiago   10/13/2020  9:30 AM Zulma Waite PT, DPT [facility]   10/20/2020  9:30 AM Tabatha Rios PT, DPT [facility]   10/23/2020  9:30 AM Tabatha Rios PT, DPT [facility]   10/27/2020 11:45 AM Tabatha Rios PT, DPT [facility]   10/30/2020 10:15 AM Tabatha Rios PT, DPT [facility]

## 2020-10-13 ENCOUNTER — HOSPITAL ENCOUNTER (OUTPATIENT)
Dept: PHYSICAL THERAPY | Age: 70
Discharge: HOME | End: 2020-10-13
Payer: MEDICARE

## 2020-10-13 PROCEDURE — 97110 THERAPEUTIC EXERCISES: CPT | Performed by: PHYSICAL THERAPIST

## 2020-10-13 NOTE — PROGRESS NOTES
PT DAILY TREATMENT NOTE    Patient Name: Lea Reed  CZKZ:[id number]  : 1950  [x]  Patient  Verified  Payor: Wilver Del Rio / Plan: [place] MEDICARE PART A & B / Product Type: Medicare /    In time:9:25  Out time:10:04  Total Treatment Time (min): 39  Total Timed Codes (min): 39  1:1 Treatment Time ( [address] only): 39   Visit #: 5 of 12    Treatment Area: Left hip pain [M25.552]    SUBJECTIVE  Pain Level (0-10 scale): 2  Any medication changes, allergies to medications, adverse drug reactions, diagnosis change, or new procedure performed?: [x] No    [] Yes (see summary sheet for update)  Subjective functional status/changes:   [] No changes reported  Feels good. Just a little bit of twinge    OBJECTIVE    39 min Therapeutic Exercise:  [x] See flow sheet :   Rationale: increase ROM, increase strength and decrease pain to improve the patients ability to complete ADLs       With   [] TE   [] TA   [] neuro   [] other: Patient Education: [x] Review HEP    [] Progressed/Changed HEP based on:   [] positioning   [] body mechanics   [] transfers   [] heat/ice application    [] other:      Other Objective/Functional Measures: NA     Pain Level (0-10 scale) post treatment: 0    ASSESSMENT/Changes in Function: Patient responds well to treatment session. Patient is progressing well. Continues to report that her lateral shift correction is helpful for reducing the pain. Will progress as tolerated. . No adverse effects were noted from today's treatment session       Patient will continue to benefit from skilled PT services to modify and progress therapeutic interventions, address functional mobility deficits, address ROM deficits, address strength deficits, analyze and address soft tissue restrictions, analyze and cue movement patterns, analyze and modify body mechanics/ergonomics, assess and modify postural abnormalities    []  See Plan of Care  []  See progress note/recertification  []  See Discharge Summary         Progress towards goals / Updated goals:  Short Term Goals: To be accomplished in 2 weeks:                   WKWOXDK will report compliance with HEP 1x/day to aid in rehabilitation program.                   Status at IE: NA                   Current:  In-progress, reviewed HEP and provided handout 10/09/2020                      Patient will increase lumbar ROM to 100% in all planes to aid in completion of ADLs.                  Status at [address] [place] [address] with lateral SB                   Current: 100%, met 10/6/2020     Long Term Goals: To be accomplished in 6 weeks:                   Patient will increase B LE strength to 5/5 MMT throughout to aid in completion of ADLs.                  Status at IE:4/5                   Current: Same as IE                      Patient will report pain no greater than 1-2/10 throughout entire day to aid in completion of ADLs.                  Status at IE: 1-6                   Current: 0-3, progressing 10/13/2020                      Patent will be able to reach overhead 10x without pain to be able to complete ADLs.                  Status at [address] [place] [address] reaching and lifting overhead                   Current: Same as IE                      Patient will improve FOTO score to 73 points to demonstrate improvement in functional status.                    Status at IE:72                   Current: Same as IE                   *FOTO score is an established functional score where 100 = no disability*    PLAN  []  Upgrade activities as tolerated     [x]  Continue plan of care  []  Update interventions per flow sheet       []  Discharge due to:_  []  Other:_      Cirilo Georges PT, DPT 10/13/2020  9:35 AM    Future Appointments   Date Time Provider Cecilia Santiago   10/20/2020  9:30 AM Lilia Romero PT, DPT [facility]   10/23/2020  9:30 AM Lilia Romero PT, DPT [facility]   10/27/2020 11:45 AM Lilia Romero PT, DPT [facility]   10/30/2020 10:15 AM Lilia Romero PT, DPT [facility]

## 2020-10-20 ENCOUNTER — HOSPITAL ENCOUNTER (OUTPATIENT)
Dept: PHYSICAL THERAPY | Age: 70
Discharge: HOME | End: 2020-10-20
Payer: MEDICARE

## 2020-10-20 PROCEDURE — 97110 THERAPEUTIC EXERCISES: CPT | Performed by: PHYSICAL THERAPIST

## 2020-10-20 NOTE — PROGRESS NOTES
PT DAILY TREATMENT NOTE    Patient Name: Angela Balderas  [id number]  : 1950  [x]  Patient  Verified  Payor: VA MEDICARE / Plan: VA MEDICARE PART A & B / Product Type: Medicare /    In time:9:30  Out time:10:11  Total Treatment Time (min): 41  Total Timed Codes (min): 41  1:1 Treatment Time ( [address] only): 41   Visit #: 6 of 12    Treatment Area: Left hip pain [M25.552]    SUBJECTIVE  Pain Level (0-10 scale): 2  Any medication changes, allergies to medications, adverse drug reactions, diagnosis change, or new procedure performed?: [x] No    [] Yes (see summary sheet for update)  Subjective functional status/changes:   [] No changes reported  Feels good. The pain is much better    OBJECTIVE    41 min Therapeutic Exercise:  [x] See flow sheet :   Rationale: increase ROM, increase strength and decrease pain to improve the patients ability to complete ADLs    With   [] TE   [] TA   [] neuro   [] other: Patient Education: [x] Review HEP    [] Progressed/Changed HEP based on:   [] positioning   [] body mechanics   [] transfers   [] heat/ice application    [] other:      Other Objective/Functional Measures: NA     Pain Level (0-10 scale) post treatment: 0    ASSESSMENT/Changes in Function: Patient responds well to treatment session. Patient is progressing well. Requires some VC for correct form with hip hinge during dead lifts. Will progress as tolerated. . No adverse effects were noted from today's treatment session       Patient will continue to benefit from skilled PT services to modify and progress therapeutic interventions, address functional mobility deficits, address ROM deficits, address strength deficits, analyze and address soft tissue restrictions, analyze and cue movement patterns, analyze and modify body mechanics/ergonomics, assess and modify postural abnormalities    []  See Plan of Care  []  See progress note/recertification  []  See Discharge Summary         Progress towards goals / Updated goals:  Short Term Goals: To be accomplished in 2 weeks:                   DONAXK will report compliance with HEP 1x/day to aid in rehabilitation program.                   Status at IE: NA                   Current:  In-progress, reviewed HEP and provided handout 10/09/2020                      Patient will increase lumbar ROM to 100% in all planes to aid in completion of ADLs.                  Status at [address] [place] [address] with lateral SB                   Current: 100%, met 10/6/2020     Long Term Goals: To be accomplished in 6 weeks:                   Patient will increase B LE strength to 5/5 MMT throughout to aid in completion of ADLs.                  Status at IE:4/5                   Current: Same as IE                      Patient will report pain no greater than 1-2/10 throughout entire day to aid in completion of ADLs.                  Status at IE: 1-6                   Current: 0-3, progressing 10/13/2020                      Patent will be able to reach overhead 10x without pain to be able to complete ADLs.                  Status at [address] [place] [address] reaching and lifting overhead                   Current: Same as IE                      Patient will improve FOTO score to 73 points to demonstrate improvement in functional status.                    Status at IE:72                   Current: Same as IE                   *FOTO score is an established functional score where 100 = no disability*    PLAN  []  Upgrade activities as tolerated     [x]  Continue plan of care  []  Update interventions per flow sheet       []  Discharge due to:_  []  Other:_      Nick Lala PT, DPT 10/20/2020  9:59 AM    Future Appointments   Date Time Provider Cecilia Santiago   10/23/2020  9:30 AM Toan Saxena, PT, DPT [facility]   10/27/2020 11:45 AM Toan Saxena, PT, DPT [facility]   10/30/2020 10:15 AM Toan Saxena, PT, DPT [facility]

## 2020-10-23 ENCOUNTER — HOSPITAL ENCOUNTER (OUTPATIENT)
Dept: PHYSICAL THERAPY | Age: 70
Discharge: HOME | End: 2020-10-23
Payer: MEDICARE

## 2020-10-23 PROCEDURE — 97110 THERAPEUTIC EXERCISES: CPT | Performed by: PHYSICAL THERAPIST

## 2020-10-23 NOTE — PROGRESS NOTES
PT DAILY TREATMENT NOTE    Patient Name: Maris García  NYSI:  : 1950  [x]  Patient  Verified  Payor: VA MEDICARE / Plan: VA MEDICARE PART A & B / Product Type: Medicare /    In time:9:30  Out time:10:08  Total Treatment Time (min): 38  Total Timed Codes (min): 38  1:1 Treatment Time ( [address] only): 45   Visit #: 7 of 12    Treatment Area: Left hip pain [M25.552]    SUBJECTIVE  Pain Level (0-10 scale): 0  Any medication changes, allergies to medications, adverse drug reactions, diagnosis change, or new procedure performed?: [x] No    [] Yes (see summary sheet for update)  Subjective functional status/changes:   [] No changes reported  Feels good. Only has had twinges of pain in the past week    OBJECTIVE    38 min Therapeutic Exercise:  [x] See flow sheet :   Rationale: increase ROM, increase strength and decrease pain to improve the patients ability to complete ADLs       With   [] TE   [] TA   [] neuro   [] other: Patient Education: [x] Review HEP    [] Progressed/Changed HEP based on:   [] positioning   [] body mechanics   [] transfers   [] heat/ice application    [] other:      Other Objective/Functional Measures: NA     Pain Level (0-10 scale) post treatment: 0    ASSESSMENT/Changes in Function: Patient responds well to treatment session. Patient is progressing well. Will likely discharge at the end of next week.  No adverse effects were noted from today's treatment session       Patient will continue to benefit from skilled PT services to modify and progress therapeutic interventions, address functional mobility deficits, address ROM deficits, address strength deficits, analyze and address soft tissue restrictions, analyze and cue movement patterns, analyze and modify body mechanics/ergonomics, assess and modify postural abnormalities    []  See Plan of Care  []  See progress note/recertification  []  See Discharge Summary         Progress towards goals / Updated goals:  Short Term Goals: To be accomplished in 2 weeks:                   Patient will report compliance with HEP 1x/day to aid in rehabilitation program.                   Status at IE: NA                   Current:  In-progress, reviewed HEP and provided handout 10/09/2020                      Patient will increase lumbar ROM to 100% in all planes to aid in completion of ADLs.                  Status at [address] [place] [address] with lateral SB                   Current: 100%, met 10/6/2020     Long Term Goals: To be accomplished in 6 weeks:                   Patient will increase B LE strength to 5/5 MMT throughout to aid in completion of ADLs.                  Status at IE:4/5                   Current: Same as IE                      Patient will report pain no greater than 1-2/10 throughout entire day to aid in completion of ADLs.                  Status at IE: 1-6                   Current: 0-3, progressing 10/13/2020                      Patent will be able to reach overhead 10x without pain to be able to complete ADLs.                  Status at [address] [place] [address] reaching and lifting overhead                   Current: No pain with [place] presses 3# 3x10                      Patient will improve FOTO score to 73 points to demonstrate improvement in functional status.                    Status at IE:72                   Current: Same as IE                   *FOTO score is an established functional score where 100 = no disability*    PLAN  []  Upgrade activities as tolerated     [x]  Continue plan of care  []  Update interventions per flow sheet       []  Discharge due to:_  []  Other:_      Elisa Enriquez PT, DPT 10/23/2020  10:00 AM    Future Appointments   Date Time Provider Cecilia Santiago   10/27/2020 11:45 AM Estrella Mc PT, DPT [facility]FRED [facility]   10/30/2020 10:15 AM Estrella Mc PT, DPT [facility]

## 2020-10-27 ENCOUNTER — HOSPITAL ENCOUNTER (OUTPATIENT)
Dept: PHYSICAL THERAPY | Age: 70
Discharge: HOME | End: 2020-10-27
Payer: MEDICARE

## 2020-10-27 PROCEDURE — 97110 THERAPEUTIC EXERCISES: CPT | Performed by: PHYSICAL THERAPIST

## 2020-10-27 NOTE — PROGRESS NOTES
PT DAILY TREATMENT NOTE    Patient Name: Lea Reed  RJYM:  : 1950  [x]  Patient  Verified  [place]or: [place] MED[place] / Plan: [place] MED[place]ARE PART A & B / Product Type: Medicare /    In time:11:40  Out time:12:25  Total Treatment Time (min): 45  Total Timed Codes (min): 45  1:1 Treatment Time ( [address] only): 39   Visit #: 8 of 12    Treatment Area: Left hip pain [M25.552]    SUBJECTIVE  Pain Level (0-10 scale): 0  Any medication changes, allergies to medications, adverse drug reactions, diagnosis change, or new procedure performed?: [x] No    [] Yes (see summary sheet for update)  Subjective functional status/changes:   [] No changes reported  Feels good. Ready to be discharged next visit. OBJECTIVE    45 min Therapeutic Exercise:  [x] See flow sheet :   Rationale: increase ROM, increase strength and decrease pain to improve the patients ability to complete ADLs       With   [] TE   [] TA   [] neuro   [] other: Patient Education: [x] Review HEP    [] Progressed/Changed HEP based on:   [] positioning   [] body mechanics   [] transfers   [] heat/ice application    [] other:      Other Objective/Functional Measures: NA     Pain Level (0-10 scale) post treatment: 1    ASSESSMENT/Changes in Function: Patient responds well to treatment session. Patient is progressing well. Minimal difficulty with exercises as prescribe. Will likely discharge next visit.  No adverse effects were noted from today's treatment session       Patient will continue to benefit from skilled PT services to modify and progress therapeutic interventions, address functional mobility deficits, address ROM deficits, address strength deficits, analyze and address soft tissue restrictions, analyze and cue movement patterns, analyze and modify body mechanics/ergonomics, assess and modify postural abnormalities    []  See Plan of Care  []  See progress note/recertification  []  See Discharge Summary         Progress towards goals / Updated goals:  Short Term Goals: To be accomplished in 2 weeks:                   [id number] will report compliance with HEP 1x/day to aid in rehabilitation program.                   Status at IE: NA                   Current:  In-progress, reviewed HEP and provided handout 10/09/2020                      Patient will increase lumbar ROM to 100% in all planes to aid in completion of ADLs.                  Status at [address] [place] [address] with lateral SB                   Current: 100%, met 10/6/2020     Long Term Goals: To be accomplished in 6 weeks:                   Patient will increase B LE strength to 5/5 MMT throughout to aid in completion of ADLs.                  Status at IE:4/5                   Current: Same as IE                      Patient will report pain no greater than 1-2/10 throughout entire day to aid in completion of ADLs.                  Status at IE: 1-6                   Current: 0-3, progressing 10/13/2020                      Patent will be able to reach overhead 10x without pain to be able to complete ADLs.                  Status at [address] [place] [address] reaching and lifting overhead                   Current: No pain with OH presses 3# 3x10                      Patient will improve FOTO score to 73 points to demonstrate improvement in functional status.                    Status at IE:72                   Current: Same as IE                   *FOTO score is an established functional score where 100 = no disability*    PLAN  []  Upgrade activities as tolerated     [x]  Continue plan of care  []  Update interventions per flow sheet       []  Discharge due to:_  []  Other:_      Nely Schwartz, PT, DPT 10/27/2020  1:17 PM    Future Appointments   Date Time Provider Cecilia Santiago   10/30/2020 10:15 AM Mallory Collins, PT, DPT [facility]

## 2020-10-30 ENCOUNTER — HOSPITAL ENCOUNTER (OUTPATIENT)
Dept: PHYSICAL THERAPY | Age: 70
Discharge: HOME | End: 2020-10-30
Payer: MEDICARE

## 2020-10-30 PROCEDURE — 97110 THERAPEUTIC EXERCISES: CPT | Performed by: PHYSICAL THERAPIST

## 2020-10-30 NOTE — PROGRESS NOTES
[facility] at [address]: [phone]   Fax: [phone]  Discharge Summary  Patient Name: Earline Pratt : 1950   Medical   Diagnosis: Left hip pain [M25.552] Treatment Diagnosis: Left hip pain   Onset Date: 1 year     Referral Source: Rober Dow MD Start of [facility]): 2020   Prior Hospitalization: See medical history Provider #: [id number]   Prior Level of Function: Active, independent w/ ADLs   Comorbidities: OA, Hx of PE   Medications: Verified on Patient Summary List      ===========================================================================================  Assessment / Summary of Care:  Earline Pratt is a 69 y.o.  yo female with left hip pain due to left lumbar radicular symptoms. Patient has made good progress to date. Meeting all goals. Is ready to be discharged to independent [facility] at this time. No adverse effects were noted from today's treatment session     ===========================================================================================    Plan: Discharge to independent [facility]. Goals:   Short Term Goals: To be accomplished in 2 weeks:                   [id number] will report compliance with HEP 1x/day to aid in rehabilitation program.                   Status at IE: NA                   Current:  In-progress, reviewed HEP and provided handout 10/09/2020                      Patient will increase lumbar ROM to 100% in all planes to aid in completion of ADLs.                  Status at [address] [place] [address] with lateral SB                   Current: 100%, met 10/6/2020     Long Term Goals: To be accomplished in 6 weeks:                   Patient will increase B LE strength to 5/5 MMT throughout to aid in completion of ADLs.                    Status at IE:                   Current: , Met 10/30/2020                      Patient will report pain no greater than 1-2/10 throughout entire day to aid in completion of ADLs.                   Status at IE: 1-6                   Current: less than 1-2 over last week 10/30/2020                      Patent will be able to reach overhead 10x without pain to be able to complete ADLs.                  Status at [address] [facility] reaching and lifting overhead                   Current: No pain with OH presses 3# 3x10, met 10/30/2020                      Patient will improve FOTO score to 73 points to demonstrate improvement in functional status.                  Status at IE:72                   Current: 90 met 10/30/2020                   *FOTO score is an established functional score where 100 = no disability*    ===========================================================================================  Subjective: Feels good. Pain is gone mostly.  Feels ready to be discharged.          Objective: MMT 5/5 grossly    Therapist Signature: Molly Cruz PT, DPT, OCS Date: 10/30/2020     Time: 12:38 PM

## 2020-10-30 NOTE — PROGRESS NOTES
PT DAILY TREATMENT NOTE    Patient Name: Dahiana Spence  Date:10/30/2020  : 1950  [x]  Patient  Verified  Payor: [place] MEDICARE / Plan: [place] MEDICARE PART A & B / Product Type: Medicare /    In time:10:15  Out time:10:50  Total Treatment Time (min): 35  Total Timed Codes (min): 35  1:1 Treatment Time ( [address] only): 35   Visit #: 9 of 12    Treatment Area: Left hip pain [M25.552]    SUBJECTIVE  Pain Level (0-10 scale): 0  Any medication changes, allergies to medications, adverse drug reactions, diagnosis change, or new procedure performed?: [x] No    [] Yes (see summary sheet for update)  Subjective functional status/changes:   [] No changes reported  Feels good. Pain is gone mostly. Feels ready to be discharged. OBJECTIVE    35 min Therapeutic Exercise:  [x] See flow sheet :   Rationale: increase ROM, increase strength and decrease pain to improve the patients ability to complete ADLs       With   [] TE   [] TA   [] neuro   [] other: Patient Education: [x] Review HEP    [] Progressed/Changed HEP based on:   [] positioning   [] body mechanics   [] transfers   [] heat/ice application    [] other:      Other Objective/Functional Measures: 0     Pain Level (0-10 scale) post treatment: 0    ASSESSMENT/Changes in Function: Patient responds well to treatment session. Patient has made good progress to date. Meeting all goals. Is ready to be discharged to independent [facility] at this time.  No adverse effects were noted from today's treatment session    []  See Plan of Care  []  See progress note/recertification  [x]  See Discharge Summary         Progress towards goals / Updated goals:  Short Term Goals: To be accomplished in 2 weeks:                   [facility]/day to aid in rehabilitation program.                   Status at IE: NA                   Current:  In-progress, reviewed HEP and provided handout 10/09/2020                      Patient will increase lumbar ROM to 100% in all planes to aid in completion of ADLs.                  Status at [address] [place] [address] with lateral SB                   Current: 100%, met 10/6/2020     Long Term Goals: To be accomplished in 6 weeks:                   Patient will increase B LE strength to 5/5 MMT throughout to aid in completion of ADLs.                  Status at IE:4/5                   Current: 5/5, Met 10/30/2020                      Patient will report pain no greater than 1-2/10 throughout entire day to aid in completion of ADLs.                  Status at IE: 1-6                   Current: less than 1-2 over last week 10/30/2020                      Patent will be able to reach overhead 10x without pain to be able to complete ADLs.                  Status at [address] [place] [address] reaching and lifting overhead                   Current: No pain with OH presses 3# 3x10, met 10/30/2020                      Patient will improve FOTO score to 73 points to demonstrate improvement in functional status.                  Status at IE:72                   Current: 90 met 10/30/2020                   *FOTO score is an established functional score where 100 = no disability*       PLAN  []  Upgrade activities as tolerated     []  Continue plan of care  []  Update interventions per flow sheet       [x]  Discharge due to:_  []  Other:_      Ralph Larkin, PT, DPT 10/30/2020  10:24 AM    No future appointments.